# Patient Record
(demographics unavailable — no encounter records)

---

## 2025-01-13 NOTE — ASSESSMENT
[FreeTextEntry1] : 45 year year old male who has been struggling with His weight for many years. MrShaunna BERNARD has tried countless diet and exercise programs, but has been unable to lose sufficient weight and keep it off. He is here today to discuss surgical options for weight loss. S/p GG Fistula repair. Presents today for a follow up.    #S/P GG Fistula Repair -Pt doing well since procedure.  -Recommending a repeat EGD to assess for fistula closure -Risks vs. benefits discussed

## 2025-01-13 NOTE — HISTORY OF PRESENT ILLNESS
[Home] : at home, [unfilled] , at the time of the visit. [Medical Office: ([facility])___] : at the medical office located in  [Verbal consent obtained from patient] : the patient, [unfilled] [FreeTextEntry4] : Felicia LOWRY [FreeTextEntry1] : 46 year year old male who has been struggling with His weight for many years. MrShaunna BERNARD has tried countless diet and exercise programs, but has been unable to lose sufficient weight and keep it off. He is here today to discuss surgical options for weight loss. S/p GG Fistula repair. Presents today for a follow up.    Pt has lost 54 lbs. Patient states he continues to follow the soft diet. States he has some intermittent burning in his back. Patient denies any abdominal pain, nausea, vomiting, dysphagia, heart burn, unintentional weight loss, diarrhea, constipation, melena, hematochezia.    EGD 11/21/24 Normal mucosa in the whole esophagus. Anatomy consistent with previous gastric bypass surgery. 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 Erythema in the stomach compatible with non-erosive gastritis. (Biopsy).Polyp in the stomach body. (Biopsy). Gastro-gastric fistula closure (Endosuture, APV and Purastat). The visualized portion of the jejunum appeared normal. Normal mucosa in the whole examined duodenum. [de-identified] : 11/21/24

## 2025-06-02 NOTE — REASON FOR VISIT
[Home] : at home, [unfilled] , at the time of the visit. [Medical Office: ([facility])___] : at the medical office located in  [Telehealth (audio & video)] : This visit was provided via telehealth using real-time 2-way audio visual technology. [Verbal consent obtained from patient] : the patient, [unfilled] [FreeTextEntry4] : Angelica [FreeTextEntry1] : F/U

## 2025-06-02 NOTE — HISTORY OF PRESENT ILLNESS
[FreeTextEntry1] : Mobidly obese on GLP1 SP endoscopic GG fistula repair x 2 with loss of 70+lbs ([phone]). States now can eat a little more than when he first had the sutures placed.  Pt has lost 54 lbs. Patient states he continues to follow the soft diet. States he has some intermittent burning in his back. Patient denies any abdominal pain, nausea, vomiting, dysphagia, heart burn, unintentional weight loss, diarrhea, constipation, melena, hematochezia.

## 2025-06-02 NOTE — ASSESSMENT
[FreeTextEntry1] : Obesity GG fistula SP PRIYA bypass  Rec: pt requesting repeat EGD to assess and maybe reinforce sutrure if needed. I explained to him that if the fistulai open again there is a good chance a repeat revision endoscopically may fail again Will try to combine with ESD of the rim. he understands the possiblity of suboptimal closure but would like to pursue it since each time he achieves weight loss after in the hope to lose more weight risks beniits and alternatives discussed To follwo up with dr Phillip as well

## 2025-06-27 NOTE — PHYSICAL EXAM
[Overweight] : overweight  [Normal] : affect appropriate [de-identified] : nonlabored breathing  [de-identified] : s1,s2 [de-identified] : soft, nontender, well healed upper abdominal incision

## 2025-06-27 NOTE — HISTORY OF PRESENT ILLNESS
[de-identified] : Mr. MENDEZ BERNARD is a pleasant 45 year year old male who has been struggling with His weight for many years.    Mr. MENDEZ BERNARD has tried countless diet and exercise programs, but has been unable to lose sufficient weight and keep it off.  He is here today to discuss surgical options for weight loss. He had an open gastric bypass many years ago in 2004  and was able to lose weight at that time. He has recently gained much of the weight back. On recent imaging he was diagnosed with a g-g fistula. He has been on ozempic and has been able to lose about 70 lbs on it but has plateaued ans interested in more weight loss.  He also has a history of recurrent marginal ulcers but is stable on PPI and carafate currently.   He was sent to me for further evaluation.  6/20: He has now had two attempts to close the gg fistula endoscopically but due to the size this closure opened back up again. He has noticed that he no longer feels restruction. He lost some weight with the closures and liquid diet that he had to be on in the post-op period but he is afraid he will gain it back. He would like to discuss revision and resection of the gg fistula.

## 2025-06-27 NOTE — PLAN
[FreeTextEntry1] : Will discuss at [facility] and with my partner Dr Brown who is more experienced with revisional bariatric surgery Will obtain thiago labs Nutrition consult and follow up Will obtain repeat endo with Dr Elijah Salinas, and would like to be present during scope Will then discuss scheduling for surgery

## 2025-06-27 NOTE — ASSESSMENT
[FreeTextEntry1] : 45 yo male with class 3 obesity and DM s/p gastric bypass with recurrent marginal ulcers and g-g fistula